# Patient Record
Sex: FEMALE | Race: WHITE | NOT HISPANIC OR LATINO | URBAN - METROPOLITAN AREA
[De-identification: names, ages, dates, MRNs, and addresses within clinical notes are randomized per-mention and may not be internally consistent; named-entity substitution may affect disease eponyms.]

---

## 2020-12-08 ENCOUNTER — OFFICE VISIT (OUTPATIENT)
Dept: URBAN - METROPOLITAN AREA SURGERY CENTER 4 | Facility: SURGERY CENTER | Age: 85
End: 2020-12-08

## 2020-12-22 ENCOUNTER — OFFICE VISIT (OUTPATIENT)
Dept: URBAN - METROPOLITAN AREA CLINIC 60 | Facility: CLINIC | Age: 85
End: 2020-12-22

## 2020-12-29 ENCOUNTER — OFFICE VISIT (OUTPATIENT)
Dept: URBAN - METROPOLITAN AREA SURGERY CENTER 4 | Facility: SURGERY CENTER | Age: 85
End: 2020-12-29

## 2021-06-09 ENCOUNTER — OFFICE VISIT (OUTPATIENT)
Dept: URBAN - METROPOLITAN AREA TELEHEALTH 2 | Facility: TELEHEALTH | Age: 86
End: 2021-06-09

## 2021-06-10 ENCOUNTER — OFFICE VISIT (OUTPATIENT)
Dept: URBAN - METROPOLITAN AREA CLINIC 63 | Facility: CLINIC | Age: 86
End: 2021-06-10

## 2021-06-14 ENCOUNTER — OFFICE VISIT (OUTPATIENT)
Dept: URBAN - METROPOLITAN AREA CLINIC 63 | Facility: CLINIC | Age: 86
End: 2021-06-14

## 2021-06-22 ENCOUNTER — OFFICE VISIT (OUTPATIENT)
Dept: URBAN - METROPOLITAN AREA SURGERY CENTER 4 | Facility: SURGERY CENTER | Age: 86
End: 2021-06-22

## 2021-09-14 ENCOUNTER — OFFICE VISIT (OUTPATIENT)
Dept: URBAN - METROPOLITAN AREA CLINIC 121 | Facility: CLINIC | Age: 86
End: 2021-09-14

## 2022-06-07 ENCOUNTER — OFFICE VISIT (OUTPATIENT)
Dept: URBAN - METROPOLITAN AREA CLINIC 63 | Facility: CLINIC | Age: 87
End: 2022-06-07

## 2022-07-09 ENCOUNTER — TELEPHONE ENCOUNTER (OUTPATIENT)
Dept: URBAN - METROPOLITAN AREA CLINIC 121 | Facility: CLINIC | Age: 87
End: 2022-07-09

## 2022-07-09 RX ORDER — LEVOTHYROXINE SODIUM 25 UG/1
TABLET ORAL TAKE AS DIRECTED
Refills: 0 | OUTPATIENT
Start: 2017-12-15 | End: 2019-03-18

## 2022-07-09 RX ORDER — VALSARTAN 160 MG/1
TABLET ORAL TAKE AS DIRECTED
Refills: 0 | OUTPATIENT
Start: 2019-03-18 | End: 2022-06-07

## 2022-07-09 RX ORDER — ASPIRIN 81 MG/1
TABLET, DELAYED RELEASE ORAL ONCE A DAY
Refills: 0 | OUTPATIENT
Start: 2019-03-18 | End: 2020-01-16

## 2022-07-09 RX ORDER — AMLODIPINE BESYLATE AND BENAZEPRIL HYDROCHLORIDE 5; 10 MG/1; MG/1
CAPSULE ORAL ONCE A DAY
Refills: 0 | OUTPATIENT
Start: 2019-03-18 | End: 2019-03-18

## 2022-07-09 RX ORDER — LEVOTHYROXINE SODIUM 25 UG/1
TABLET ORAL TAKE AS DIRECTED
Refills: 0 | OUTPATIENT
Start: 2019-03-18 | End: 2019-03-18

## 2022-07-09 RX ORDER — SUCRALFATE 1 G/1
TABLET ORAL ONCE A DAY
Refills: 0 | OUTPATIENT
Start: 2022-06-07 | End: 2022-06-07

## 2022-07-09 RX ORDER — FLUCONAZOLE 200 MG/1
TABLET ORAL ONCE A DAY
Refills: 0 | OUTPATIENT
Start: 2022-06-07 | End: 2022-06-07

## 2022-07-09 RX ORDER — OMEPRAZOLE 40 MG/1
CAPSULE, DELAYED RELEASE ORAL ONCE A DAY
Refills: 0 | OUTPATIENT
Start: 2022-06-07 | End: 2022-06-07

## 2022-07-09 RX ORDER — ACETAMINOPHEN, DEXTROMETHORPHAN HBR, DOXYLAMINE SUCCINATE, PHENYLEPHRINE HCL 10-5-325MG
KIT ORAL ONCE A DAY
Refills: 0 | OUTPATIENT
Start: 2019-03-18 | End: 2022-06-07

## 2022-07-09 RX ORDER — MELATONIN 10 MG
TABLET, SUBLINGUAL SUBLINGUAL ONCE A DAY
Refills: 0 | OUTPATIENT
Start: 2017-12-15 | End: 2019-03-18

## 2022-07-09 RX ORDER — ASPIRIN 81 MG/1
TABLET, DELAYED RELEASE ORAL ONCE A DAY
Refills: 0 | OUTPATIENT
Start: 2017-12-15 | End: 2019-03-18

## 2022-07-09 RX ORDER — MELATONIN 10 MG
TABLET, SUBLINGUAL SUBLINGUAL ONCE A DAY
Refills: 0 | OUTPATIENT
Start: 2019-03-18 | End: 2019-04-04

## 2022-07-09 RX ORDER — VALSARTAN 160 MG/1
TABLET ORAL TAKE AS DIRECTED
Refills: 0 | OUTPATIENT
Start: 2022-06-07 | End: 2022-06-07

## 2022-07-09 RX ORDER — VALSARTAN 160 MG/1
TABLET ORAL TAKE AS DIRECTED
Refills: 0 | OUTPATIENT
Start: 2017-12-15 | End: 2019-03-18

## 2022-07-09 RX ORDER — LEVOTHYROXINE SODIUM 25 UG/1
TABLET ORAL TAKE AS DIRECTED
Refills: 0 | OUTPATIENT
Start: 2019-03-18 | End: 2022-06-07

## 2022-07-09 RX ORDER — ACETAMINOPHEN, DEXTROMETHORPHAN HBR, DOXYLAMINE SUCCINATE, PHENYLEPHRINE HCL 10-5-325MG
KIT ORAL ONCE A DAY
Refills: 0 | OUTPATIENT
Start: 2017-12-15 | End: 2019-03-18

## 2022-07-09 RX ORDER — AMLODIPINE BESYLATE AND BENAZEPRIL HYDROCHLORIDE 5; 10 MG/1; MG/1
CAPSULE ORAL ONCE A DAY
Refills: 0 | OUTPATIENT
Start: 2019-03-18 | End: 2022-06-07

## 2022-07-09 RX ORDER — NITROGLYCERIN 400 UG/1
SPRAY ORAL AS NEEDED
Refills: 0 | OUTPATIENT
Start: 2019-03-18 | End: 2019-03-18

## 2022-07-09 RX ORDER — MELATONIN 10 MG
TABLET, SUBLINGUAL SUBLINGUAL ONCE A DAY
Refills: 0 | OUTPATIENT
Start: 2020-01-16 | End: 2022-06-07

## 2022-07-09 RX ORDER — NITROGLYCERIN 400 UG/1
SPRAY ORAL AS NEEDED
Refills: 0 | OUTPATIENT
Start: 2019-03-18 | End: 2019-04-04

## 2022-07-10 ENCOUNTER — TELEPHONE ENCOUNTER (OUTPATIENT)
Dept: URBAN - METROPOLITAN AREA CLINIC 121 | Facility: CLINIC | Age: 87
End: 2022-07-10

## 2022-07-10 RX ORDER — MELATONIN 10 MG
TABLET, SUBLINGUAL SUBLINGUAL ONCE A DAY
Refills: 0 | Status: ACTIVE | COMMUNITY
Start: 2022-06-07

## 2022-07-10 RX ORDER — LEVOTHYROXINE SODIUM 25 UG/1
TABLET ORAL TAKE AS DIRECTED
Refills: 0 | Status: ACTIVE | COMMUNITY
Start: 2022-06-07

## 2022-07-10 RX ORDER — AMLODIPINE BESYLATE AND BENAZEPRIL HYDROCHLORIDE 5; 10 MG/1; MG/1
CAPSULE ORAL ONCE A DAY
Refills: 0 | Status: ACTIVE | COMMUNITY
Start: 2022-06-07

## 2022-07-10 RX ORDER — ATORVASTATIN CALCIUM 10 MG/1
TABLET, FILM COATED ORAL ONCE A DAY
Refills: 0 | Status: ACTIVE | COMMUNITY
Start: 2022-06-07

## 2022-07-10 RX ORDER — ACETAMINOPHEN, DEXTROMETHORPHAN HBR, DOXYLAMINE SUCCINATE, PHENYLEPHRINE HCL 10-5-325MG
KIT ORAL ONCE A DAY
Refills: 0 | Status: ACTIVE | COMMUNITY
Start: 2022-06-07

## 2022-07-30 ENCOUNTER — TELEPHONE ENCOUNTER (OUTPATIENT)
Age: 87
End: 2022-07-30

## 2022-07-31 ENCOUNTER — TELEPHONE ENCOUNTER (OUTPATIENT)
Age: 87
End: 2022-07-31

## 2023-08-24 ENCOUNTER — APPOINTMENT (RX ONLY)
Dept: URBAN - METROPOLITAN AREA CLINIC 120 | Facility: CLINIC | Age: 88
Setting detail: DERMATOLOGY
End: 2023-08-24

## 2023-08-24 DIAGNOSIS — Z85.828 PERSONAL HISTORY OF OTHER MALIGNANT NEOPLASM OF SKIN: ICD-10-CM

## 2023-08-24 DIAGNOSIS — L82.1 OTHER SEBORRHEIC KERATOSIS: ICD-10-CM

## 2023-08-24 PROCEDURE — ? COUNSELING

## 2023-08-24 PROCEDURE — 99202 OFFICE O/P NEW SF 15 MIN: CPT

## 2023-08-24 PROCEDURE — ? ADDITIONAL NOTES

## 2023-08-24 ASSESSMENT — LOCATION ZONE DERM
LOCATION ZONE: NOSE
LOCATION ZONE: EYELID
LOCATION ZONE: FACE

## 2023-08-24 ASSESSMENT — LOCATION SIMPLE DESCRIPTION DERM
LOCATION SIMPLE: NOSE
LOCATION SIMPLE: LEFT INFERIOR EYELID
LOCATION SIMPLE: RIGHT EYELID
LOCATION SIMPLE: LEFT CHEEK

## 2023-08-24 ASSESSMENT — LOCATION DETAILED DESCRIPTION DERM
LOCATION DETAILED: LEFT SUPERIOR LATERAL MALAR CHEEK
LOCATION DETAILED: NASAL DORSUM
LOCATION DETAILED: RIGHT MEDIAL CANTHUS
LOCATION DETAILED: LEFT LATERAL INFERIOR EYELID

## 2023-08-24 NOTE — PROCEDURE: ADDITIONAL NOTES
Detail Level: Simple
Render Risk Assessment In Note?: no
Additional Notes: Pt stated that she scratched the area this morning. Recommended to recheck in 4 weeks. Pt stated that she was leaving in 1 week for [place].